# Patient Record
Sex: FEMALE | Race: WHITE | NOT HISPANIC OR LATINO | Employment: FULL TIME | URBAN - METROPOLITAN AREA
[De-identification: names, ages, dates, MRNs, and addresses within clinical notes are randomized per-mention and may not be internally consistent; named-entity substitution may affect disease eponyms.]

---

## 2017-06-02 ENCOUNTER — ALLSCRIPTS OFFICE VISIT (OUTPATIENT)
Dept: OTHER | Facility: OTHER | Age: 47
End: 2017-06-02

## 2017-06-02 PROCEDURE — G0145 SCR C/V CYTO,THINLAYER,RESCR: HCPCS | Performed by: OBSTETRICS & GYNECOLOGY

## 2017-06-02 PROCEDURE — 87624 HPV HI-RISK TYP POOLED RSLT: CPT | Performed by: OBSTETRICS & GYNECOLOGY

## 2017-06-06 ENCOUNTER — LAB REQUISITION (OUTPATIENT)
Dept: LAB | Facility: HOSPITAL | Age: 47
End: 2017-06-06
Payer: COMMERCIAL

## 2017-06-06 DIAGNOSIS — Z12.4 ENCOUNTER FOR SCREENING FOR MALIGNANT NEOPLASM OF CERVIX: ICD-10-CM

## 2017-06-06 DIAGNOSIS — Z11.51 ENCOUNTER FOR SCREENING FOR HUMAN PAPILLOMAVIRUS (HPV): ICD-10-CM

## 2017-06-07 LAB — HPV RRNA GENITAL QL NAA+PROBE: NORMAL

## 2017-06-12 LAB
LAB AP GYN PRIMARY INTERPRETATION: NORMAL
Lab: NORMAL

## 2017-11-26 DIAGNOSIS — Z12.31 ENCOUNTER FOR SCREENING MAMMOGRAM FOR MALIGNANT NEOPLASM OF BREAST: ICD-10-CM

## 2018-01-14 NOTE — MISCELLANEOUS
Message   Recorded as Task   Date: 04/20/2016 06:25 PM, Created By: Rose Marie Mo   Task Name: Go to Result   Assigned To: Jerome Ramos   Regarding Patient: Yoly Jonas, Status: Active   Comment:    Jerome Ramos - 20 Apr 2016 6:25 PM     TASK CREATED  Left mammogram normal with significant breast density  Would recommend 3-D mammogram in 6 months in lieu of her screening mammogram   Melba Caballero - 21 Apr 2016 9:21 AM     TASK REPLIED TO: Previously Assigned To [facility]  letter and script sent to pt        Active Problems    1  Breast lump (611 72) (N63)   2  Breast pain (611 71) (N64 4)   3  Breast self examination education, encounter for (V55 49) (Z71 89)   4  Encounter for screening mammogram for malignant neoplasm of breast (L56 12)   (Z12 31)    Current Meds   1  Cyclobenzaprine HCl - 10 MG Oral Tablet; Therapy: [id number] to Recorded   2  Glycopyrrolate 2 MG Oral Tablet; Therapy: 30SLY5828 to Recorded   3  LORazepam 1 MG Oral Tablet; Therapy: 78KMA0335 to Recorded   4  Sertraline HCl - 100 MG Oral Tablet; Therapy: 62UAA8565 to Recorded   5  Temazepam 30 MG Oral Capsule; Therapy: 74HWT2074 to Recorded    Allergies    1  No Known Drug Allergies    Plan  Encounter for screening mammogram for malignant neoplasm of breast    · MAMMO SCREENING LEFT W 3D & CAD; Status:Hold For - Scheduling,Retrospective  Authorization;  Requested [id number];     Signatures   Electronically signed by : Lashonda Malave, ; Apr 21 2016  9:21AM EST                       (Author)

## 2018-01-15 VITALS
WEIGHT: 153.13 LBS | DIASTOLIC BLOOD PRESSURE: 84 MMHG | HEIGHT: 68 IN | BODY MASS INDEX: 23.21 KG/M2 | SYSTOLIC BLOOD PRESSURE: 124 MMHG

## 2018-01-16 NOTE — MISCELLANEOUS
Message   Recorded as Task   Date: 11/28/2016 08:22 AM, Created By: Mirna Roberson   Task Name: Go to Result   Assigned To: Jerome Ramos   Regarding Patient: Karen Romano, Status: Active   CommentBascom Garimater - 28 Nov 2016 8:22 AM     TASK CREATED  Patient with normal mammogram but increased density noted  Can consider automated breast ultrasound soon and/or 3-D mammogram next year  Melba Caballero - 28 Nov 2016 9:07 AM     TASK REPLIED TO: Previously Assigned To [facility]  letter and script sent to pt        Active Problems    1  Breast self examination education, encounter for (V65 49) (Z71 89)   2  Dense breast tissue (793 82) (R92 2)   3  Encounter for routine gynecological examination (V72 31) (Z01 419)   4  Encounter for screening mammogram for malignant neoplasm of breast (V76 12)   (Z12 31)   5  Mass of breast (611 72) (N63)    Current Meds   1  Cyclobenzaprine HCl - 10 MG Oral Tablet; Therapy: 18QEN3191 to Recorded   2  Glycopyrrolate 2 MG Oral Tablet; Therapy: 94FIK0439 to Recorded   3  LORazepam 1 MG Oral Tablet; Therapy: 29AGN1394 to Recorded   4  Sertraline HCl - 100 MG Oral Tablet; Therapy: 56NUN8962 to Recorded   5  Temazepam 30 MG Oral Capsule; Therapy: 30RIY9545 to Recorded    Allergies    1  No Known Drug Allergies    Plan  Dense breast tissue    · US BREAST SCREENING BILATERAL COMPLETE (ABUS); Status:Hold For -  Eventful;  Requested for:28Nov2016;     Signatures   Electronically signed by : Mansoor Brush, ; Nov 28 2016  9:08AM EST                       (Author)

## 2020-08-26 ENCOUNTER — OFFICE VISIT (OUTPATIENT)
Dept: OBGYN CLINIC | Facility: CLINIC | Age: 50
End: 2020-08-26
Payer: COMMERCIAL

## 2020-08-26 VITALS
DIASTOLIC BLOOD PRESSURE: 82 MMHG | SYSTOLIC BLOOD PRESSURE: 130 MMHG | BODY MASS INDEX: 23.87 KG/M2 | WEIGHT: 157 LBS | TEMPERATURE: 97.8 F

## 2020-08-26 DIAGNOSIS — Z20.2 CHLAMYDIA CONTACT: Primary | ICD-10-CM

## 2020-08-26 PROCEDURE — 87591 N.GONORRHOEAE DNA AMP PROB: CPT | Performed by: OBSTETRICS & GYNECOLOGY

## 2020-08-26 PROCEDURE — 99213 OFFICE O/P EST LOW 20 MIN: CPT | Performed by: OBSTETRICS & GYNECOLOGY

## 2020-08-26 PROCEDURE — 87491 CHLMYD TRACH DNA AMP PROBE: CPT | Performed by: OBSTETRICS & GYNECOLOGY

## 2020-08-26 RX ORDER — AZITHROMYCIN 500 MG/1
TABLET, FILM COATED ORAL
Qty: 2 TABLET | Refills: 0 | Status: SHIPPED | OUTPATIENT
Start: 2020-08-26 | End: 2020-08-26

## 2020-08-26 NOTE — PROGRESS NOTES
Assessment/Plan:  Contact with someone known to have chlamydia, patient will be treated with azithromycin 1 g orally  There are no diagnoses linked to this encounter  Subjective:     Patient ID: Fiona Diaz is a 52 y o  female  HPI:  Yonis Arzolas is here to be tested for chlamydia and gonorrhea as she found out a boyfriend have hers, whom she last had contact with in July, tested positive for Chlamydia  The patient herself is expressing no symptoms at this time  Review of Systems  all negative    Objective:     Physical Exam  vulvar and vaginal exams are unremarkable  There is surgical absence of the uterus and cervix  A swab for chlamydia is performed  No pelvic pain on bimanual exam or ovarian masses are noted

## 2020-08-28 LAB
C TRACH DNA SPEC QL NAA+PROBE: NEGATIVE
N GONORRHOEA DNA SPEC QL NAA+PROBE: NEGATIVE

## 2020-11-30 ENCOUNTER — TELEPHONE (OUTPATIENT)
Dept: OBGYN CLINIC | Facility: CLINIC | Age: 50
End: 2020-11-30

## 2024-08-28 ENCOUNTER — OFFICE VISIT (OUTPATIENT)
Dept: GYNECOLOGY | Facility: CLINIC | Age: 54
End: 2024-08-28
Payer: COMMERCIAL

## 2024-08-28 VITALS
BODY MASS INDEX: 27.15 KG/M2 | HEIGHT: 67 IN | DIASTOLIC BLOOD PRESSURE: 98 MMHG | WEIGHT: 173 LBS | SYSTOLIC BLOOD PRESSURE: 160 MMHG

## 2024-08-28 DIAGNOSIS — Z11.51 SCREENING FOR HUMAN PAPILLOMAVIRUS (HPV): ICD-10-CM

## 2024-08-28 DIAGNOSIS — R92.30 DENSE BREAST TISSUE: ICD-10-CM

## 2024-08-28 DIAGNOSIS — N95.1 MENOPAUSAL SYMPTOMS: ICD-10-CM

## 2024-08-28 DIAGNOSIS — Z12.31 ENCOUNTER FOR SCREENING MAMMOGRAM FOR BREAST CANCER: ICD-10-CM

## 2024-08-28 DIAGNOSIS — Z01.419 WOMEN'S ANNUAL ROUTINE GYNECOLOGICAL EXAMINATION: Primary | ICD-10-CM

## 2024-08-28 DIAGNOSIS — N63.25 MASS OVERLAPPING MULTIPLE QUADRANTS OF LEFT BREAST: ICD-10-CM

## 2024-08-28 PROBLEM — N89.8 VAGINAL CYST: Status: RESOLVED | Noted: 2017-06-02 | Resolved: 2024-08-28

## 2024-08-28 PROBLEM — N89.8 VAGINAL CYST: Status: ACTIVE | Noted: 2017-06-02

## 2024-08-28 PROCEDURE — 99386 PREV VISIT NEW AGE 40-64: CPT | Performed by: OBSTETRICS & GYNECOLOGY

## 2024-08-28 RX ORDER — BUSPIRONE HYDROCHLORIDE 5 MG/1
TABLET ORAL
COMMUNITY
Start: 2024-07-28

## 2024-08-28 RX ORDER — VENLAFAXINE 75 MG/1
75 TABLET ORAL 2 TIMES DAILY
COMMUNITY
Start: 2024-07-28

## 2024-08-28 NOTE — PROGRESS NOTES
Assessment & Plan   Diagnoses and all orders for this visit:    Women's annual routine gynecological examination    Encounter for screening mammogram for breast cancer  -     Cancel: Mammo screening bilateral w 3d & cad; Future    Mass overlapping multiple quadrants of left breast  -     Mammo diagnostic bilateral w 3d & cad; Future  -     US breast left limited (diagnostic); Future    Menopausal symptoms  -     Follicle stimulating hormone; Future  -     Estradiol; Future    Dense breast tissue    Other orders  -     busPIRone (BUSPAR) 5 mg tablet; TAKE 2 TABLETS BY MOUTH TWICE A DAY FOR 30 DAYS  -     venlafaxine (EFFEXOR) 75 mg tablet; Take 75 mg by mouth 2 (two) times a day    1.  Yearly exam-Pap smear done with HPV testing of vagina, self breast awareness reviewed, calcium/vitamin D recommendations discussed, mammogram request given, colonoscopy recommended.  2. menopausal symptoms-notes significant night sweats with hot flushes, sleep disorder, and emotional irritability over the past year or so.  She also did see her primary doctor because of dizziness and possible fainting episodes.  Strongly recommend she continue to follow-up with her primary provider to confirm that this is not related to other potential etiologies such as cardiac.  She will do so.  She was interested in hormonal testing, FSH and estradiol were ordered.  We did briefly discuss menopausal treatments.  She already is on Effexor 75 mg twice daily.  Discussed other options such as hormone replacement therapy and Veozah.  Information on each was given along with pamphlets from the [employer].  She will get the blood work done and review the information.  We will discuss at follow-up visit.  3.  Prior history of chlamydia exposure-was last seen in the office August 2020 by Dr. Chowdary.  Was empirically treated with Zithromax 1 g at that time.  Ultimately, GC/chlamydia culture was negative.  She now has a partner of 1 year  duration, declines need for STD testing  4. breast fullness-was seen by me 2015 with fullness and breast pain.  Had imaging 2016 with only hyperechoic tissue noted at 11:00 in the left breast.  She continues with fullness on exam today, approximately 1.5 to 2 cm area of fullness noted between 11-12 o'clock noted just inferior to the superior aspect of the breast.  It is without warmth or erythema or discharge or tenderness appreciated.  It is asymmetric compared to the right breast in the similar location.  We discussed this in some detail.  She states she had biopsy done of the left breast which was benign done at [facility] somewhere in this timeframe.  She had mammogram screening last year at [place] which was negative by her report.  Received report, mammogram 2023 was normal with extremely dense breast changes noted.  We will attempt to get that finding.  Given the asymmetric nature of the findings today, would recommend evaluation.  Laboratory sheet for bilateral diagnostic mammogram 3D along with left breast ultrasound was ordered.  Follow-up in 4 to 6 weeks time for breast check or as needed.  5. history of endometriosis-denies complaints.  Has had resolution of any symptoms since hysterectomy done years ago.  6. history of anxiety-seems worsened with menopause symptoms.  To follow-up with primary provider  7. dense breast-evaluation as noted above  8.  Distant history of abnormal Pap-noted prior to hysterectomy.  Has had no issues since then.  Last Pap 2017 was negative with negative HPV.  Pap with HPV done today with disposition as per findings.  9.  Blood pressure-160/98 today.  She states that has been high due to anxiety.  She is not on blood pressure medicine.  Recommend follow-up with primary provider.  10. other status post  x 1, son age 37 who was adopted by her aunt and uncle.  She delivered at age 17.  Follow-up 4 to 6 weeks time for breast check and menopause  discussion or as needed.  Subjective   Patient ID: Ml Acosta is a 54 y.o. female.    Vitals:    24 1022   BP: 160/98     HPI    The following portions of the patient's history were reviewed and updated as appropriate: allergies, current medications, past family history, past medical history, past social history, past surgical history, and problem list.  Past Medical History:   Diagnosis Date    Hypothyroidism      Past Surgical History:   Procedure Laterality Date    HYSTERECTOMY       OB History    Para Term  AB Living   1 1       1   SAB IAB Ectopic Multiple Live Births                  # Outcome Date GA Lbr [place]/2nd Weight Sex Type Anes PTL Lv   1 Para                Current Outpatient Medications:     busPIRone (BUSPAR) 5 mg tablet, TAKE 2 TABLETS BY MOUTH TWICE A DAY FOR 30 DAYS, Disp: , Rfl:     venlafaxine (EFFEXOR) 75 mg tablet, Take 75 mg by mouth 2 (two) times a day, Disp: , Rfl:   No Known Allergies  Social History     Socioeconomic History    Marital status: Single     Spouse name: None    Number of children: None    Years of education: None    Highest education level: None   Occupational History    None   Tobacco Use    Smoking status: Every Day    Smokeless tobacco: Current   Vaping Use    Vaping status: Never Used   Substance and Sexual Activity    Alcohol use: Yes     Comment: social    Drug use: Never    Sexual activity: Yes     Partners: Male   Other Topics Concern    None   Social History Narrative    None     Social Determinants of Health     Financial Resource Strain: Not on file   Food Insecurity: Not on file   Transportation Needs: Not on file   Physical Activity: Not on file   Stress: Not on file   Social Connections: Not on file   Intimate Partner Violence: Not on file   Housing Stability: Not on file     Family History   Problem Relation Age of Onset    Multiple sclerosis Mother     No Known Problems Father     No Known Problems Sister     No Known Problems  "Brother        Review of Systems    Objective   Physical Exam  OBGyn Exam     Objective      /98 (BP Location: Right arm, Patient Position: Sitting)   Ht 5' 6.5\" (1.689 m)   Wt 78.5 kg (173 lb)   BMI 27.50 kg/m²     General:   alert and oriented, in no acute distress   Neck: normal to inspection and palpation   Breast: normal appearance, no masses or tenderness   Heart:    Lungs:    Abdomen: soft, non-tender, without masses or organomegaly   Vulva: normal   Vagina: Mildly atrophic, without erythema or lesions or discharge.  Vaginal cuff well supported with very minimal laxity noted anteriorly, no cystocele.   Cervix: surgically absent   Uterus: surgically absent   Adnexa: no mass, fullness, tenderness   Rectum: negative, skin tag noted external to the anus toward the right at 9:00    Psych:  Normal mood and affect   Skin:  Without obvious lesions   Eyes: symmetric, with normal movements and reactivity   Musculoskeletal:  Normal muscle tone and movements appreciated       "

## 2024-09-03 LAB
CLINICAL INFO: ABNORMAL
CYTO CVX: ABNORMAL
CYTOLOGY CMNT CVX/VAG CYTO-IMP: ABNORMAL
DATE PREVIOUS BX: ABNORMAL
HPV E6+E7 MRNA CVX QL NAA+PROBE: DETECTED
LMP START DATE: ABNORMAL
SL AMB PREV. PAP:: ABNORMAL
SPECIMEN SOURCE CVX/VAG CYTO: ABNORMAL
STAT OF ADQ CVX/VAG CYTO-IMP: ABNORMAL

## 2024-09-03 NOTE — RESULT ENCOUNTER NOTE
Pap is negative, but HPV is positive.  Patient did have prior history of abnormal Pap in the past.  Would recommend colposcopy.  Please arrange for this.  I could find no MyChart, please call the patient.

## 2024-09-06 ENCOUNTER — TELEPHONE (OUTPATIENT)
Dept: GYNECOLOGY | Facility: CLINIC | Age: 54
End: 2024-09-06

## 2024-09-06 NOTE — TELEPHONE ENCOUNTER
Tried to call patient, no answer left vm for patient to call the office for results and upcoming appointment.----- Message from Jerome Ramos MD sent at 9/3/2024  5:37 PM EDT -----  Pap is negative, but HPV is positive.  Patient did have prior history of abnormal Pap in the past.  Would recommend colposcopy.  Please arrange for this.  I could find no MyChart, please call the patient.

## 2024-10-08 ENCOUNTER — PATIENT MESSAGE (OUTPATIENT)
Dept: GYNECOLOGY | Facility: CLINIC | Age: 54
End: 2024-10-08

## 2024-10-08 NOTE — PATIENT COMMUNICATION
Patient sent message, FSH 22 with estradiol 70.  This is consistent with perimenopause.  To review in detail with patient at upcoming visit.

## 2024-11-21 ENCOUNTER — OFFICE VISIT (OUTPATIENT)
Dept: GYNECOLOGY | Facility: CLINIC | Age: 54
End: 2024-11-21
Payer: COMMERCIAL

## 2024-11-21 VITALS — BODY MASS INDEX: 28.11 KG/M2 | DIASTOLIC BLOOD PRESSURE: 90 MMHG | SYSTOLIC BLOOD PRESSURE: 156 MMHG | WEIGHT: 176.8 LBS

## 2024-11-21 DIAGNOSIS — R92.30 DENSE BREAST TISSUE: ICD-10-CM

## 2024-11-21 DIAGNOSIS — N95.1 MENOPAUSAL SYMPTOMS: ICD-10-CM

## 2024-11-21 DIAGNOSIS — B97.7 HPV (HUMAN PAPILLOMA VIRUS) INFECTION: Primary | ICD-10-CM

## 2024-11-21 DIAGNOSIS — Z87.410 HISTORY OF CERVICAL DYSPLASIA: ICD-10-CM

## 2024-11-21 PROCEDURE — 99213 OFFICE O/P EST LOW 20 MIN: CPT | Performed by: OBSTETRICS & GYNECOLOGY

## 2024-11-21 PROCEDURE — 57421 EXAM/BIOPSY OF VAG W/SCOPE: CPT | Performed by: OBSTETRICS & GYNECOLOGY

## 2024-11-21 RX ORDER — RIZATRIPTAN BENZOATE 10 MG/1
TABLET ORAL
COMMUNITY
Start: 2024-11-12

## 2024-11-21 RX ORDER — LORAZEPAM 0.5 MG/1
TABLET ORAL
COMMUNITY
Start: 2006-10-01

## 2024-11-21 RX ORDER — CYCLOBENZAPRINE HCL 10 MG
TABLET ORAL
COMMUNITY
Start: 2002-01-01

## 2024-11-21 NOTE — PROGRESS NOTES
Colposcopy    Date/Time: 11/21/2024 11:35 AM    Performed by: Jerome Ramos MD  Authorized by: Jerome Ramos MD    Other Assisting Provider: No    Verbal consent obtained?: Yes    Risks and benefits: Risks, benefits and alternatives were discussed    Consent given by:  Patient  Time Out:     Time out performed at:  11/21/2024 11:35 PM  Patient states understanding of procedure being performed: Yes    Patient's understanding of procedure matches consent: Yes    Procedure consent matches procedure scheduled: Yes    Patient identity confirmed:  Verbally with patient  Pre-procedure:     Prepped with: acetic acid and Lugol    Procedure:     Procedure: Colposcopy of vagina w/ biopsy      Under satisfactory analgesia the patient was prepped and draped in the dorsal lithotomy position: yes      Palmer speculum was placed in the vagina: yes      Monsel's solution was applied: yes      Specimen(s) to pathology: yes    Post-procedure:     Findings: White epithelium      Impression: Low grade cervical dysplasia      Patient tolerance of procedure:  Tolerated well, no immediate complications  Comments:      Speculum placed.  Acetic acid and Lugol solution applied with acetowhite and iodine negative changes noted midline anterior vagina, approximately 1.5 cm superior to the vaginal cuff.  Tischler biopsy used to obtain specimen.  Patient tolerated procedure well.  Minimal bleeding noted, Monsel solution applied with excellent hemostasis.

## 2024-11-21 NOTE — PROGRESS NOTES
Assessment & Plan   Diagnoses and all orders for this visit:    HPV (human papilloma virus) infection  -     Colposcopy    History of cervical dysplasia  -     Colposcopy    Menopausal symptoms    Dense breast tissue    Other orders  -     cyclobenzaprine (FLEXERIL) 10 mg tablet  -     LORazepam (ATIVAN) 0.5 mg tablet  -     rizatriptan (MAXALT) 10 mg tablet    1.  Pap with HPV-was seen 8/28/2024 with negative Pap with positive HPV.  Have attempted to proceed with colposcopy multiple times, patient seen today for this procedure.  Acetowhite and iodine negative changes noted anterior midline vagina, approximately 1.5 cm superior to the vaginal cuff.  Biopsy done, well-tolerated by the patient.  We will inform her of the findings.  Pelvic rest recommended for the next 5 to 7 days.  If okay, follow-up August/September 2025 for yearly exam with Pap/HPV or as needed.  The patient had numerous questions about HPV and when she may have acquired it.  She has been with her current partner for 2 years time.  Previous to that, was with a partner who was exposed to chlamydia.  She was seen in 2020 for that exposure and had negative chlamydia testing.  Her last Pap/HPV was in 2017 given her limited compliance with yearly exams.  She was likely exposed after that time.  However, she did have distant history of abnormal Pap prior to her hysterectomy so it is possible she could have had a more distant HPV exposure as well.  2. menopausal symptoms-lab studies notable for FSH of 22 and estradiol of 70.  This is consistent with perimenopausal.  She has noted some hot flushes and night sweats and some emotional irritability.  She is on venlafaxine with dose recently increased.  She will observe to see how she does on this.  We did discuss other options for treatment.  OTC options such as soy or black cohosh with Estroven were reviewed.  Also, given information from the [employer] regarding options.  Did discuss  hormone therapy and Veozah and information of each was placed in the MyChart.  She will review all this and then call or return if interested in treatment.  3.  Prior history of chlamydia exposure-noted 2020 with negative findings.  She was treated empirically at that time.  4. prior breast fullness/dense breast changes-previously given request for bilateral diagnostic mammogram.  This was not done as of yet, encouraged to do so.  5.  History of endometriosis-denies complaints.  Status post hysterectomy years ago for this indication with resolution of symptoms.  6.  History of anxiety  7.  History of  x 1  Follow-up 2025 for yearly exam with Pap/HPV or as needed.      Subjective   Patient ID: Ml Acosta is a 54 y.o. female.    Vitals:    24 1137   BP: 156/90     HPI    The following portions of the patient's history were reviewed and updated as appropriate: allergies, current medications, past family history, past medical history, past social history, past surgical history, and problem list.  Past Medical History:   Diagnosis Date    Hypothyroidism      Past Surgical History:   Procedure Laterality Date    HYSTERECTOMY       OB History    Para Term  AB Living   1 1    1   SAB IAB Ectopic Multiple Live Births             # Outcome Date GA Lbr [place]/2nd Weight Sex Type Anes PTL Lv   1 Para                Current Outpatient Medications:     cyclobenzaprine (FLEXERIL) 10 mg tablet, , Disp: , Rfl:     LORazepam (ATIVAN) 0.5 mg tablet, , Disp: , Rfl:     rizatriptan (MAXALT) 10 mg tablet, , Disp: , Rfl:     venlafaxine (EFFEXOR) 75 mg tablet, Take 75 mg by mouth 2 (two) times a day, Disp: , Rfl:     busPIRone (BUSPAR) 5 mg tablet, TAKE 2 TABLETS BY MOUTH TWICE A DAY FOR 30 DAYS, Disp: , Rfl:   No Known Allergies  Social History     Socioeconomic History    Marital status: Single     Spouse name: None    Number of children: None    Years of education: None    Highest  education level: None   Occupational History    None   Tobacco Use    Smoking status: Every Day    Smokeless tobacco: Current   Vaping Use    Vaping status: Never Used   Substance and Sexual Activity    Alcohol use: Yes     Comment: social    Drug use: Never    Sexual activity: Yes     Partners: Male   Other Topics Concern    None   Social History Narrative    None     Social Drivers of Health     Financial Resource Strain: Not on file   Food Insecurity: Not on file   Transportation Needs: Not on file   Physical Activity: Not on file   Stress: Not on file   Social Connections: Not on file   Intimate Partner Violence: Not on file   Housing Stability: Not on file     Family History   Problem Relation Age of Onset    Multiple sclerosis Mother     No Known Problems Father     No Known Problems Sister     No Known Problems Brother        Review of Systems    Objective   Physical Exam  OBGyn Exam     Objective      /90 (BP Location: Left arm, Patient Position: Sitting)   Wt 80.2 kg (176 lb 12.8 oz)   BMI 28.11 kg/m²     General:   alert and oriented, in no acute distress   Neck:    Breast:    Heart:    Lungs:    Abdomen: soft, non-tender, without masses or organomegaly   Vulva: normal   Vagina: Without erythema or lesions or discharge.  Normal   Cervix: surgically absent   Uterus: surgically absent   Adnexa:    Rectum:     Psych:  Normal mood and affect   Skin:  Without obvious lesions   Eyes: symmetric, with normal movements and reactivity   Musculoskeletal:  Normal muscle tone and movements appreciated

## 2024-11-27 LAB
CLINICAL INFO: NORMAL
PATH REPORT.FINAL DX SPEC: NORMAL
PATHOLOGIST NAME: NORMAL
SPECIMEN SOURCE: NORMAL

## 2024-11-29 ENCOUNTER — RESULTS FOLLOW-UP (OUTPATIENT)
Dept: GYNECOLOGY | Facility: CLINIC | Age: 54
End: 2024-11-29

## 2024-11-29 NOTE — RESULT ENCOUNTER NOTE
Vaginal biopsy is benign.  No intervention is recommended.  Follow-up as recommended at previous visit.